# Patient Record
Sex: MALE | Race: WHITE
[De-identification: names, ages, dates, MRNs, and addresses within clinical notes are randomized per-mention and may not be internally consistent; named-entity substitution may affect disease eponyms.]

---

## 2018-06-25 ENCOUNTER — HOSPITAL ENCOUNTER (EMERGENCY)
Dept: HOSPITAL 25 - UCEAST | Age: 58
Discharge: HOME | End: 2018-06-25
Payer: COMMERCIAL

## 2018-06-25 VITALS — SYSTOLIC BLOOD PRESSURE: 126 MMHG | DIASTOLIC BLOOD PRESSURE: 87 MMHG

## 2018-06-25 DIAGNOSIS — R50.9: ICD-10-CM

## 2018-06-25 DIAGNOSIS — R53.81: ICD-10-CM
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
## 2018-06-25 DIAGNOSIS — S80.861A: Primary | ICD-10-CM

## 2018-06-25 DIAGNOSIS — L03.115: ICD-10-CM
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
## 2018-06-25 DIAGNOSIS — R51: ICD-10-CM
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
## 2018-06-25 DIAGNOSIS — Z88.8: ICD-10-CM

## 2018-06-25 PROCEDURE — 99212 OFFICE O/P EST SF 10 MIN: CPT

## 2018-06-25 PROCEDURE — G0463 HOSPITAL OUTPT CLINIC VISIT: HCPCS

## 2018-06-25 NOTE — UC
Rolanda ROSADO Gabriel, scribed for Fransisco Mas MD on 06/25/18 at 1000 .





Skin Complaint HPI





- HPI Summary


HPI Summary: 





This patient is a 58 year [place] [facility] [place] 

the right calf that he noticed on 6-21-18. Patient reports fever of 101F, HA, 

general malaise, and redness at bite site. Pt states he is unsure if it was a 

tick. Previous bee allergy.





- History of Current Complaint


Time Seen by Provider: 06/25/18 09:55


Stated Complaint: RIGHT LEG SKIN COMPLAINT


Hx Obtained From: Patient


Onset/Duration: Lasting Days, Still Present


Skin Exposure Onset/Duration: Days Ago


Onset Severity: Mild


Current Severity: Mild


Pain Intensity: 0


Location: Other - RLE


Character: Redness


Associated Signs & Symptoms: Positive: Fever





- Allergy/Home Medications


Allergies/Adverse Reactions: 


 Allergies











Allergy/AdvReac Type Severity Reaction Status Date / Time


 


ezetimibe [From Vytorin] Allergy  Muscle Ache Verified 06/25/18 10:08


 


simvastatin [From Vytorin] Allergy  Muscle Ache Verified 06/25/18 10:08














Review of Systems


Constitutional: Fever, Other - general malaise


Skin: Other - insect bite


Neurological: Headache


All Other Systems Reviewed And Are Negative: Yes





PMH/Surg Hx/FS Hx/Imm Hx


Other History Of: 


   Negative For: HIV, Hepatitis B, Anticoagulant Therapy





- Surgical History


Surgical History: None





- Family History


Known Family History: 


   Negative: Respiratory Disease, Seizure Disorder, Blood Disorder





- Social History


Alcohol Use: None


Substance Use Type: None


Smoking Status (MU): Never Smoked Tobacco





Physical Exam





- Summary


Physical Exam Summary: 





General: well-appearing, no pain distress


Skin: right popliteal area is erythematous, and there is a blanching rash that 

is [id number]. No foreign body seen, no streaking 


Head: normal


Eyes: EOMI, STEFANY


ENT: normal


Neck: supple, nontender


Respiratory: CTA, breath sounds present


Cardiovascular: RRR


Abdomen: soft, nontender


Bowel: present


Musculoskeletal: normal, strength/ROM intact


Neurological: sensory/motor intact, A&O x3


Psychological: affect/mood appropriate


 


Triage Information Reviewed: Yes


Vital Signs: 


 Initial Vital Signs











Temp  98.5 F   06/25/18 10:00


 


Pulse  68   06/25/18 10:00


 


Resp  16   06/25/18 10:00


 


BP  126/87   06/25/18 10:00


 


Pulse Ox  98   06/25/18 10:00











Vital Signs Reviewed: Yes





Course/Dx





- Diagnoses


Provider Diagnoses: RIGHT LEG CELLULITIS





Discharge





- Sign-Out/Discharge


Documenting (check all that apply): Discharge/Admit/Transfer





- Discharge Plan


Condition: Stable


Disposition: HOME


Prescriptions: 


DOXYcycline CAP(*) [DOXYcycline 100MG CAP(*)] 100 mg PO BID #42 cap


Patient Education Materials:  Cellulitis (ED)


Referrals: 


Shima Morales MD [Primary Care Provider] - 


Additional Instructions: 


FOLLOW UP WITH YOUR DOCTOR.


GET RECHECKED FOR ANY WORSENING OF YOUR CONDITION OR QUESTIONS OR CONCERNS.





- Billing Disposition and Condition


Condition: STABLE


Disposition: Home





The documentation as recorded by the Rolanda palacios Gabriel accurately reflects 

the service I personally performed and the decisions made by me, Fransisco Mas MD.